# Patient Record
Sex: MALE | Race: WHITE | NOT HISPANIC OR LATINO
[De-identification: names, ages, dates, MRNs, and addresses within clinical notes are randomized per-mention and may not be internally consistent; named-entity substitution may affect disease eponyms.]

---

## 2021-06-03 DIAGNOSIS — M77.8 OTHER ENTHESOPATHIES, NOT ELSEWHERE CLASSIFIED: ICD-10-CM

## 2021-06-03 DIAGNOSIS — Z82.3 FAMILY HISTORY OF STROKE: ICD-10-CM

## 2021-06-03 DIAGNOSIS — Z83.3 FAMILY HISTORY OF DIABETES MELLITUS: ICD-10-CM

## 2021-06-03 DIAGNOSIS — Z78.9 OTHER SPECIFIED HEALTH STATUS: ICD-10-CM

## 2021-06-03 PROBLEM — Z00.00 ENCOUNTER FOR PREVENTIVE HEALTH EXAMINATION: Status: ACTIVE | Noted: 2021-06-03

## 2021-06-14 ENCOUNTER — APPOINTMENT (OUTPATIENT)
Dept: PEDIATRIC ORTHOPEDIC SURGERY | Facility: CLINIC | Age: 46
End: 2021-06-14
Payer: OTHER MISCELLANEOUS

## 2021-06-14 PROCEDURE — 99213 OFFICE O/P EST LOW 20 MIN: CPT

## 2021-06-14 PROCEDURE — 99072 ADDL SUPL MATRL&STAF TM PHE: CPT

## 2021-06-14 RX ORDER — LISINOPRIL AND HYDROCHLOROTHIAZIDE TABLETS 10; 12.5 MG/1; MG/1
10-12.5 TABLET ORAL
Qty: 90 | Refills: 0 | Status: ACTIVE | COMMUNITY
Start: 2021-01-27

## 2021-06-14 RX ORDER — GABAPENTIN 800 MG/1
800 TABLET, COATED ORAL
Qty: 30 | Refills: 0 | Status: ACTIVE | COMMUNITY
Start: 2021-04-05

## 2021-06-14 RX ORDER — HYDROCODONE BITARTRATE AND ACETAMINOPHEN 10; 325 MG/1; MG/1
10-325 TABLET ORAL
Qty: 30 | Refills: 0 | Status: DISCONTINUED | COMMUNITY
Start: 2021-04-05

## 2021-06-14 RX ORDER — BACLOFEN 10 MG/1
10 TABLET ORAL
Qty: 60 | Refills: 0 | Status: ACTIVE | COMMUNITY
Start: 2021-03-08

## 2021-06-14 RX ORDER — NAPROXEN 375 MG/1
375 TABLET ORAL
Qty: 60 | Refills: 0 | Status: ACTIVE | COMMUNITY
Start: 2021-03-15

## 2021-06-14 RX ORDER — SILDENAFIL 20 MG/1
20 TABLET ORAL
Qty: 30 | Refills: 0 | Status: ACTIVE | COMMUNITY
Start: 2020-12-18

## 2021-06-15 ENCOUNTER — APPOINTMENT (OUTPATIENT)
Dept: PEDIATRIC ORTHOPEDIC SURGERY | Facility: CLINIC | Age: 46
End: 2021-06-15
Payer: OTHER MISCELLANEOUS

## 2021-06-15 NOTE — HISTORY OF PRESENT ILLNESS
[de-identified] : This 45-year-old returns for reevaluation of his work injury to the cervical spine.  He has continued to have neck pain on/off especially at the end of a workday.  Radiation into the right shoulder and upper extremity occasional numbness and tingling no weakness.  It is to be noted this patient is currently working full-time in his normal job capacity.  At the end of a workday however and in the evening he does have increasing pain in the above areas.  Physical therapy has been beneficial to him however we have not been able to obtain authorization for further treatments.

## 2021-06-15 NOTE — PHYSICAL EXAM
[No Restrictions] : No work restrictions [Fit For Work] : fit for work [Percentage Of Disability ( ___ % )] : currently ~he/she~ is at [unfilled]% disability [de-identified] : His exam today reveals he has restricted full extension to the neck as well as pain and mild restriction of motion on rotation and tilt to the right.  Mild spasm is noted on the right side of the subaxial region extending into the right trapezius.  There are no trigger points present.  He has good motion to both upper extremities with no motor weakness.  Deep tendon reflexes are symmetric and intact.  These represent no significant changes from his prior exam.  There are no additional body parts affected by this injury. [FreeTextEntry3] : Currently working without restrictions [FreeTextEntry1] : It is my opinion the incidence in question is that the patient described as the competent medical cause of his injury and his complaints are consistent with a history of his illness.  His objective findings on today's exam are consistent with his history of illness.

## 2021-06-15 NOTE — ASSESSMENT
[FreeTextEntry1] : Impression: Right cervical radiculitis with herniated disc by history\par \par This patient will continue to work he has not reached maximal medical improvement.  It is my opinion that further physical therapy is indicated and we are seeking -2 authorization for this.

## 2021-07-28 ENCOUNTER — APPOINTMENT (OUTPATIENT)
Dept: PEDIATRIC ORTHOPEDIC SURGERY | Facility: CLINIC | Age: 46
End: 2021-07-28
Payer: OTHER MISCELLANEOUS

## 2021-07-28 VITALS — WEIGHT: 220 LBS | HEIGHT: 71 IN | BODY MASS INDEX: 30.8 KG/M2

## 2021-07-28 PROCEDURE — 99213 OFFICE O/P EST LOW 20 MIN: CPT

## 2021-07-28 PROCEDURE — 99072 ADDL SUPL MATRL&STAF TM PHE: CPT

## 2021-07-29 NOTE — PHYSICAL EXAM
[No Restrictions] : No work restrictions [Fit For Work] : fit for work [de-identified] : Exam today reveals he has slight restriction of full rotation to the right along with tilt.  Extension is more painful for him and is limited as well.  He extends approximately 15 degrees on today's visit.  Flexion is mildly restricted and less painful.  Paravertebral muscle spasm and tenderness continues more so on the right side extending into the right intrascapular region no obvious trigger points.  There is no atrophy to the right upper extremity he is able to place extremity in space without difficulty.  He remains neurologically intact. [FreeTextEntry3] : Working normal duty [FreeTextEntry1] : There has not been any changes noted of any significance on today's examination with regards to the area of injury the nature of the injury as well as his subjective and objective complaints.  No additional body parts are affected by this work injury.  On today's exam there were no diagnostic tests performed.  We are seeking authorization for physical therapy.  This patient will continue with his prescribed medications and is working his normal duties.  It is my opinion the patient's subjective complaints as well as his objective findings are consistent with the injury described.  He will return for follow-up in 6 weeks time.

## 2021-07-29 NOTE — HISTORY OF PRESENT ILLNESS
[de-identified] : This 45-year-old returns for reevaluation of his work injury of the cervical spine.  He continues with no significant interval change since last seen with chronic neck pain and stiffness radiating into the right upper extremity numbness and paresthesias no motor weakness.  He continues to work normal duties.  As well he continues under the care of Dr. Dean, pain management specialist who has treated him with one epidural steroid injection as well as multiple trigger point injections.  He continues on baclofen and gabapentin and codeine for pain.

## 2021-07-29 NOTE — ASSESSMENT
[FreeTextEntry1] : Impression: Herniated disc with right cervical radiculitis.\par \par He will continue with his medications as prescribed along with follow-up with the pain management specialist.  We are seeking authorization for renewed physical therapy.  He will continue working.  I will see him in 6 weeks.

## 2021-09-14 ENCOUNTER — APPOINTMENT (OUTPATIENT)
Dept: PEDIATRIC ORTHOPEDIC SURGERY | Facility: CLINIC | Age: 46
End: 2021-09-14
Payer: OTHER MISCELLANEOUS

## 2021-09-14 VITALS — HEIGHT: 71 IN | WEIGHT: 220 LBS | BODY MASS INDEX: 30.8 KG/M2

## 2021-09-14 PROCEDURE — 99213 OFFICE O/P EST LOW 20 MIN: CPT

## 2021-09-14 PROCEDURE — 99072 ADDL SUPL MATRL&STAF TM PHE: CPT

## 2021-09-14 NOTE — ASSESSMENT
[FreeTextEntry1] : Impression: Herniated disc C5-6 by history with chronic cervical radiculitis.\par \par We are again seeking authorization for physical therapy from the compensation board.  We also seek authorization for further consultation with the pain management specialist Dr. Bach.  He will continue with baclofen and gabapentin as prescribed by the pain management specialist.  He will return in 2 months for follow-up.\par \par My opinion that the incident of July 13, 2020 is the competent medical cause of this patient's illness and is consistent with his history as well as my objective findings.  He will continue working full duty.

## 2021-09-14 NOTE — HISTORY OF PRESENT ILLNESS
[Yes] : The patient is currently working. [de-identified] : This 45-year-old returns today for follow-up of a work injury dating back to July 13, 2020.  This patient has continued to have pain in the base of his neck radiating into the intrascapular region and right upper extremity.  He has stiffness crepitus on motion of his neck.  He is working full duty and has been for some time.  He still is symptomatic by the end of the workday he is miserable.  He has been seeing a chiropractor on occasion with some relief noted.  He has not been seen by the pain management doctor for follow-up of recent.  He continues on gabapentin and baclofen. [FreeTextEntry1] : This is a follow-up to a work injury from July 13, 2020.  This patient is working full-time without restrictions.  He continues with pain stiffness to his neck with pain that radiates into the intrascapular region and right upper extremity.  He does note crepitus on motion of his neck these are features that have continued over time.  He has been seeing a chiropractor on/off with some benefit noted.  He has not been seen by pain management as of recent.  His complaints have been relatively unchanged over time with periods of exacerbation depending on his level of activity at work..  There have been no diagnostic test recently performed.  We have been seeking authorization for physical therapy. [FreeTextEntry4] : Patient treatment in the past has included trigger point injections nonsteroidal anti-inflammatory medications and physical therapy.

## 2021-09-14 NOTE — PHYSICAL EXAM
[de-identified] : His exam today reveals normal stance and gait.  He has painful motion to the cervical spine with slight restriction of rotation and tilt to both sides of the midline.  There is no atrophy to either shoulder girdle.  He does have spasm and tenderness in the base of the spine and intrascapular region with no obvious trigger points on today's visit.  He has good motion of both upper extremities in space.  He is neurologically intact.  He does have a mildly positive Spurling's test on today's exam

## 2021-11-16 ENCOUNTER — APPOINTMENT (OUTPATIENT)
Dept: PEDIATRIC ORTHOPEDIC SURGERY | Facility: CLINIC | Age: 46
End: 2021-11-16

## 2021-11-23 ENCOUNTER — APPOINTMENT (OUTPATIENT)
Dept: PEDIATRIC ORTHOPEDIC SURGERY | Facility: CLINIC | Age: 46
End: 2021-11-23
Payer: OTHER MISCELLANEOUS

## 2021-11-23 VITALS — HEIGHT: 71 IN | BODY MASS INDEX: 30.8 KG/M2 | WEIGHT: 220 LBS

## 2021-11-23 PROCEDURE — 99213 OFFICE O/P EST LOW 20 MIN: CPT | Mod: 25

## 2021-11-23 PROCEDURE — 99072 ADDL SUPL MATRL&STAF TM PHE: CPT

## 2021-11-23 PROCEDURE — 20610 DRAIN/INJ JOINT/BURSA W/O US: CPT

## 2021-11-23 RX ORDER — NAPROXEN 375 MG/1
375 TABLET, DELAYED RELEASE ORAL TWICE DAILY
Qty: 60 | Refills: 1 | Status: ACTIVE | COMMUNITY
Start: 2021-11-23 | End: 1900-01-01

## 2021-11-23 NOTE — PROCEDURE
[FreeTextEntry1] : Under sterile technique using a 21-gauge needle with a Betadine prep the right shoulder was injected through a lateral subacromial portal with 40 mg of Kenalog and 5 cc of 1% lidocaine with a Band-Aid dressing applied at the conclusion.  This was tolerated without incident.

## 2021-11-23 NOTE — HISTORY OF PRESENT ILLNESS
[FreeTextEntry1] : This 46-year-old right-handed gentleman returns for follow-up of his work injury from July 12, 2020.  This to the cervical spine as well as the right shoulder.  He has continued to have pain and stiffness and limited motion to the right shoulder with the sensation of weakness.  His neck pain has been ongoing with radiation down the right upper extremity with numbness and tingling in the thumb index and middle fingers.  He has had 1 epidural steroid injection into the cervical spine with minimal improvement he is scheduled to see the pain management doctor in the next 1-2 weeks.  He has continued to work normal duties.

## 2021-11-23 NOTE — ASSESSMENT
[FreeTextEntry1] : Impression: Herniated disc cervical spine with cervical radiculitis strain right rotator cuff rule out rotator cuff tear.\par \par The patient has been injected into the subacromial space will continue with Naprosyn.  Physical therapy authorization has been sought along with MRI of the right shoulder.  He will return in 6 weeks for reevaluation.\par \par He will continue working normal duties.  His subjective and objective findings are consistent with his injury which is causally related to the accident of July 12, 2020 he will return in 6 weeks for reevaluation

## 2021-11-23 NOTE — PHYSICAL EXAM
[de-identified] : His exam today reveals he has mild restriction of extension of the cervical spine as well as rotation and tilt to the right.  Mild spasm is noted on the right side of the subaxial region extending into the trapezius and intrascapular region on the right.  No trigger points present.  The right shoulder reveals no evidence of atrophy though he does have mild limitation of forward flexion abduction as well as rotation both internal/external.  Positive Neer and Thibodeaux he does have mild weakness on testing of the supraspinatus.  The joints distally are unremarkable to exam

## 2022-01-11 ENCOUNTER — APPOINTMENT (OUTPATIENT)
Dept: PEDIATRIC ORTHOPEDIC SURGERY | Facility: CLINIC | Age: 47
End: 2022-01-11
Payer: OTHER MISCELLANEOUS

## 2022-01-11 VITALS — HEIGHT: 71 IN | WEIGHT: 220 LBS | BODY MASS INDEX: 30.8 KG/M2

## 2022-01-11 PROCEDURE — 99213 OFFICE O/P EST LOW 20 MIN: CPT

## 2022-01-11 PROCEDURE — 73030 X-RAY EXAM OF SHOULDER: CPT

## 2022-01-11 PROCEDURE — 99072 ADDL SUPL MATRL&STAF TM PHE: CPT

## 2022-01-11 NOTE — ASSESSMENT
[FreeTextEntry1] : Impression: Cervical radiculitis, strain right rotator cuff rule out tear.\par \par The again are seeking authorization for MRI without contrast of the right shoulder.  He will continue under the care of Dr. Zaida grijalva pain management specialist.  I will see him in 2 months.\par \par Is my opinion there is causal relation of the above neck and right shoulder complaints.

## 2022-01-11 NOTE — PHYSICAL EXAM
[de-identified] : His exam today reveals normal stance.  He has good motion to the cervical spine mild spasm is noted on the right side of the mid axial/subaxial region extending into the trapezius and intrascapular region on the right side.  The right shoulder maintains good motion as well though he does have pain on full forward flexion abduction.  He has pain on palpation of the subacromial space.  No instability on stress.  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  Mild degenerative change to the glenohumeral joint

## 2022-01-11 NOTE — HISTORY OF PRESENT ILLNESS
[FreeTextEntry1] : This patient returns for reevaluation of his work injury to the neck and right shoulder from July 13, 2020.  He had a second epidural injection he still has symptomatology to the right side of his neck that radiates into the right shoulder girdle.  He still is having pain to his right shoulder as well though he maintains reasonable motion.  He has continued to work full-time.  This without restrictions.  We are awaiting authorization for MRI of the right shoulder.  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  This was many years ago and he has tolerated this nonunion over the long-term without any deficits whatsoever.

## 2022-03-08 ENCOUNTER — APPOINTMENT (OUTPATIENT)
Dept: PEDIATRIC ORTHOPEDIC SURGERY | Facility: CLINIC | Age: 47
End: 2022-03-08
Payer: OTHER MISCELLANEOUS

## 2022-03-08 VITALS — WEIGHT: 220 LBS | BODY MASS INDEX: 30.8 KG/M2 | HEIGHT: 71 IN

## 2022-03-08 PROCEDURE — 99212 OFFICE O/P EST SF 10 MIN: CPT | Mod: 25

## 2022-03-08 PROCEDURE — 99072 ADDL SUPL MATRL&STAF TM PHE: CPT

## 2022-03-08 PROCEDURE — 20610 DRAIN/INJ JOINT/BURSA W/O US: CPT

## 2022-03-08 RX ORDER — VALACYCLOVIR 1 G/1
1 TABLET, FILM COATED ORAL
Qty: 6 | Refills: 0 | Status: ACTIVE | COMMUNITY
Start: 2022-02-24

## 2022-03-08 RX ORDER — NAPROXEN 375 MG/1
375 TABLET, DELAYED RELEASE ORAL TWICE DAILY
Qty: 60 | Refills: 1 | Status: ACTIVE | COMMUNITY
Start: 2022-03-08 | End: 1900-01-01

## 2022-03-08 NOTE — HISTORY OF PRESENT ILLNESS
[FreeTextEntry1] : This 46-year-old returns for follow-up of his work-related injury of July 13, 2020.  Ongoing complaints of pain to the neck that radiate into the right upper extremity as well as pain in the right shoulder.  He does have clicking on motion of the shoulder.  There have been no significant interval changes since last seen other than ongoing pain in the above areas.  He does continue to work full-time without restrictions.  He continues under the care of Dr. Pal pain management specialist.

## 2022-03-08 NOTE — PROCEDURE
[FreeTextEntry1] : The right shoulder following a Betadine prep was injected from an anterior portal with a 22-gauge needle with 40 mg of Kenalog and 6 cc of 1% lidocaine with a Band-Aid dressing applied at the conclusion this was tolerated without incident

## 2022-03-08 NOTE — ASSESSMENT
[FreeTextEntry1] : Impression: Right cervical radiculitis, strain right rotator cuff.\par \par Following the injection which was tolerated well he has been instructed to continue with a home exercise program along with continued use of Naprosyn.  He will continue working his normal duties without restrictions.  We have again discussed his MRI findings as well as his options.  He will return in 6 weeks

## 2022-04-19 ENCOUNTER — APPOINTMENT (OUTPATIENT)
Dept: PEDIATRIC ORTHOPEDIC SURGERY | Facility: CLINIC | Age: 47
End: 2022-04-19
Payer: OTHER MISCELLANEOUS

## 2022-04-19 VITALS — HEIGHT: 71 IN | BODY MASS INDEX: 30.8 KG/M2 | WEIGHT: 220 LBS | TEMPERATURE: 96.6 F

## 2022-04-19 PROCEDURE — 99072 ADDL SUPL MATRL&STAF TM PHE: CPT

## 2022-04-19 PROCEDURE — 99212 OFFICE O/P EST SF 10 MIN: CPT

## 2022-04-19 RX ORDER — NAPROXEN 375 MG/1
375 TABLET ORAL TWICE DAILY
Qty: 60 | Refills: 1 | Status: ACTIVE | COMMUNITY
Start: 2022-04-19 | End: 1900-01-01

## 2022-04-19 NOTE — HISTORY OF PRESENT ILLNESS
[FreeTextEntry1] : This patient returns today for follow-up of his work injury of July 13, 2020.  He has not had any significant change with regards to his ongoing complaints of pain in the neck that radiate into he right upper extremity as well as pain in the right shoulder.  There have not been any significant changes since his last visit.  He notes clicking on motion of his shoulder though he is functioning reasonably well.  He continues under the care of of Dr. Pal pain management.

## 2022-04-19 NOTE — ASSESSMENT
[FreeTextEntry1] : Impression: Right cervical radiculopathy, strain rotator cuff right shoulder.\par \par He will continue with a home exercise program along with Naprosyn which does provide relief.  He is working full-time normal duties without any restrictions and will continue to do so.  He will return in 3 months

## 2022-04-19 NOTE — PHYSICAL EXAM
[de-identified] : Exam today reveals supple motion to the cervical spine with mild discomfort at the limits of flexion and rotation.  With mild spasm more so on the right side of the mid axial region.  No trigger points present.  The right shoulder his motion is mildly restricted in abduction and forward flexion.  Rotation minimally restricted as well.  His overall strength is 4+/5 to the shoulder girdle.  He does remain neurologically intact

## 2022-05-04 RX ORDER — CELECOXIB 200 MG/1
200 CAPSULE ORAL DAILY
Qty: 30 | Refills: 1 | Status: ACTIVE | COMMUNITY
Start: 2022-05-04 | End: 1900-01-01

## 2022-07-19 ENCOUNTER — APPOINTMENT (OUTPATIENT)
Dept: PEDIATRIC ORTHOPEDIC SURGERY | Facility: CLINIC | Age: 47
End: 2022-07-19

## 2022-07-19 VITALS — WEIGHT: 220 LBS | BODY MASS INDEX: 30.8 KG/M2 | HEIGHT: 71 IN | TEMPERATURE: 97.2 F

## 2022-07-19 PROCEDURE — 99212 OFFICE O/P EST SF 10 MIN: CPT

## 2022-07-19 PROCEDURE — 99072 ADDL SUPL MATRL&STAF TM PHE: CPT

## 2022-07-19 RX ORDER — NAPROXEN 375 MG/1
375 TABLET, DELAYED RELEASE ORAL TWICE DAILY
Qty: 60 | Refills: 1 | Status: ACTIVE | COMMUNITY
Start: 2022-07-19 | End: 1900-01-01

## 2022-07-21 NOTE — PHYSICAL EXAM
[de-identified] : His exam again reveals good motion to the cervical spine though it is with pain at the limits especially on rotation and tilt to both sides.  Mild spasm is noted in the subaxial region right greater than left.  No trigger points present on today's exam.  Evaluation of the right shoulder reveals mild limitation of full abduction and forward flexion in the scapular plane his rotation is minimally restricted in external as well as internal rotation reaching behind the back.  His strength is acceptable for age.  He is neurologically stable

## 2022-07-21 NOTE — HISTORY OF PRESENT ILLNESS
[de-identified] : This 46-year-old [FreeTextEntry1] : This 46-year-old returns for follow-up of his work injury of July 13, 2020.  He has ongoing complaints of pain in the neck that radiate into the right upper extremity as well as pain and restricted motion of a mild nature to the right shoulder.  There has been no significant interval change with regards to his subjective complaints since last seen.  He is scheduled to see the pain management specialist for further evaluation and treatment of his above complaints.

## 2022-07-21 NOTE — ASSESSMENT
[FreeTextEntry1] : Impression: Status post work injury with cervical radiculitis and strain right rotator cuff.\par \par This patient will continue conservatively with naproxen.  He will follow-up with the pain management specialist.  He is working full-time without restrictions.  He will return in 3 months for follow-up

## 2022-11-02 ENCOUNTER — APPOINTMENT (OUTPATIENT)
Dept: PEDIATRIC ORTHOPEDIC SURGERY | Facility: CLINIC | Age: 47
End: 2022-11-02

## 2022-11-02 VITALS — BODY MASS INDEX: 30.8 KG/M2 | WEIGHT: 220 LBS | TEMPERATURE: 97.2 F | HEIGHT: 71 IN

## 2022-11-02 PROCEDURE — 99213 OFFICE O/P EST LOW 20 MIN: CPT

## 2022-11-02 PROCEDURE — 99072 ADDL SUPL MATRL&STAF TM PHE: CPT

## 2022-11-02 RX ORDER — NAPROXEN 375 MG/1
375 TABLET, DELAYED RELEASE ORAL TWICE DAILY
Qty: 60 | Refills: 2 | Status: ACTIVE | COMMUNITY
Start: 2022-11-02 | End: 1900-01-01

## 2022-11-02 NOTE — HISTORY OF PRESENT ILLNESS
[FreeTextEntry1] : This 47-year-old returns for follow-up of his work injury of July 13, 2020.  This patient is essentially unchanged with continued complaints of pain mainly in the right side of the cervical spine radiating into the right upper extremity as well as into the right intrascapular region.  He has not had any significant motor weakness bladder or bowel dysfunction.  He continues with nonsteroidals and a home exercise program.  He is followed by pain management for injections as part of his management for herniated disc of the cervical spine and cervical radiculitis.  It is to be noted he is working normal duty.

## 2022-11-02 NOTE — PHYSICAL EXAM
[de-identified] : His exam today reveals he is maintaining reasonable motion to the cervical spine though again he has pain at the limits of motion in all planes.  Spasm is noted on the right side of his neck with no obvious trigger points present.  Right shoulder reveals again mild limitation of full abduction and forward flexion.  His rotation is good.  His strength is acceptable for his age and he is continuing to be neurologically stable

## 2022-11-02 NOTE — ASSESSMENT
[FreeTextEntry1] : Impression: Herniated disc cervical spine with right cervical radiculitis strain of right rotator cuff\par \par He will continue conservatively with Naprosyn and will continue to follow-up with his pain management physician.  He will return in 3 months for recheck

## 2023-02-01 ENCOUNTER — APPOINTMENT (OUTPATIENT)
Dept: PEDIATRIC ORTHOPEDIC SURGERY | Facility: CLINIC | Age: 48
End: 2023-02-01
Payer: OTHER MISCELLANEOUS

## 2023-02-01 VITALS
HEIGHT: 71 IN | TEMPERATURE: 97.2 F | WEIGHT: 220 LBS | SYSTOLIC BLOOD PRESSURE: 132 MMHG | DIASTOLIC BLOOD PRESSURE: 86 MMHG | BODY MASS INDEX: 30.8 KG/M2

## 2023-02-01 PROCEDURE — 99072 ADDL SUPL MATRL&STAF TM PHE: CPT

## 2023-02-01 PROCEDURE — 99212 OFFICE O/P EST SF 10 MIN: CPT

## 2023-02-01 RX ORDER — NAPROXEN 375 MG/1
375 TABLET, DELAYED RELEASE ORAL TWICE DAILY
Qty: 60 | Refills: 1 | Status: ACTIVE | COMMUNITY
Start: 2023-02-01 | End: 1900-01-01

## 2023-02-01 NOTE — PHYSICAL EXAM
[de-identified] : His examination today reveals mild restricted motion to the cervical spine in flexion extension.  Extension tends to be more uncomfortable for him.  Mild spasm is noted on both sides of the midline in the subaxial region no trigger points present.  His right shoulder has a very mild arc of impingement without evidence of instability and reasonable strength present.  He is neurologically intact and stable

## 2023-02-01 NOTE — HISTORY OF PRESENT ILLNESS
[de-identified] : This 47-year-old returns for follow-up of a work injury dated July 13, 2020.  Over time he has continued to have episodes of neck pain and stiffness radiating into the right upper extremity as well as stiffness of his right shoulder.  At times this will create trouble for him using his arms above the horizontal plane.  Overall he is pretty much status quo.  He is scheduled to see the pain management specialist in approximately 4-6 weeks time.  He is working full-time without restrictions [FreeTextEntry1] : This patient returns for follow-up of his work injury dated July 13, 2020.  He continues to episodically have pain and stiffness to his neck as well as to the right shoulder.  He has had no significant neural

## 2023-02-01 NOTE — ASSESSMENT
[FreeTextEntry1] : Impression: Right cervical radiculitis, strain right rotator cuff.\par \par Will continue with symptomatic treatment along with Naprosyn which has been reordered.  He will follow-up with pain management specialist.  There have been no significant changes subjectively as well as objectively with regards to this patient and his work injury.

## 2023-05-03 ENCOUNTER — APPOINTMENT (OUTPATIENT)
Dept: PEDIATRIC ORTHOPEDIC SURGERY | Facility: CLINIC | Age: 48
End: 2023-05-03
Payer: OTHER MISCELLANEOUS

## 2023-05-03 VITALS
TEMPERATURE: 96.9 F | WEIGHT: 220 LBS | SYSTOLIC BLOOD PRESSURE: 135 MMHG | DIASTOLIC BLOOD PRESSURE: 70 MMHG | BODY MASS INDEX: 30.8 KG/M2 | HEIGHT: 71 IN

## 2023-05-03 PROCEDURE — 99213 OFFICE O/P EST LOW 20 MIN: CPT

## 2023-05-03 RX ORDER — NAPROXEN 375 MG/1
375 TABLET, DELAYED RELEASE ORAL TWICE DAILY
Qty: 60 | Refills: 1 | Status: ACTIVE | COMMUNITY
Start: 2023-05-03 | End: 1900-01-01

## 2023-05-03 NOTE — HISTORY OF PRESENT ILLNESS
[FreeTextEntry1] : This 47-year-old returns for follow-up of his work injury July 13, 2020.  He continues to have discomfort in his right shoulder but more so of the neck with pain numbness and tingling that radiates down the right upper extremity.  He was recently seen by the pain management doctor who provided him with trigger point injections.  He continues under his care as well.  Naproxen which has been prescribed does provide some relief.  He is working full-time without restrictions.  There has been no other injuries to have impact on the above.

## 2023-05-03 NOTE — ASSESSMENT
[FreeTextEntry1] : Impression: Right cervical radiculitis with herniated disks, strain right rotator cuff.\par \par He will continue with Naprosyn as prescribed with GI precautions.  Exercise program as he has continued over time.  He will follow-up in 3 months.  It is my impression there is causal relationship between the work injury of the above date and his subjective complaints and objective exam.

## 2023-05-03 NOTE — PHYSICAL EXAM
[de-identified] : His exam reveals painful motion to the neck especially in rotation to the right and tilt extension causes him pain as well.  There is spasm and tenderness on the right side of the subaxial region extending into the trapezius though no obvious trigger points present.  He does maintain good motion to his right shoulder though it is with discomfort at the limits his strength is acceptable.  No obvious instability on stress of the glenohumeral joint.  He is neurologically stable

## 2023-08-25 ENCOUNTER — APPOINTMENT (OUTPATIENT)
Dept: PEDIATRIC ORTHOPEDIC SURGERY | Facility: CLINIC | Age: 48
End: 2023-08-25

## 2023-09-01 ENCOUNTER — APPOINTMENT (OUTPATIENT)
Dept: PEDIATRIC ORTHOPEDIC SURGERY | Facility: CLINIC | Age: 48
End: 2023-09-01
Payer: OTHER MISCELLANEOUS

## 2023-09-01 VITALS
TEMPERATURE: 97.1 F | WEIGHT: 220 LBS | HEIGHT: 71 IN | DIASTOLIC BLOOD PRESSURE: 88 MMHG | SYSTOLIC BLOOD PRESSURE: 139 MMHG | BODY MASS INDEX: 30.8 KG/M2

## 2023-09-01 PROCEDURE — 99213 OFFICE O/P EST LOW 20 MIN: CPT

## 2023-09-01 NOTE — ASSESSMENT
[FreeTextEntry1] : Impression: Right cervical radiculopathy, strain rotator cuff right.  We will continue conservatively with medications as prescribed and a home exercise program and will return in approximately 4 months.  His subjective and objective findings are felt to be consistent with the work related injury of the date mentioned above

## 2023-09-01 NOTE — PHYSICAL EXAM
[de-identified] : His exam today reveals he has good motion to the cervical spine though it is with discomfort at the limits of full extension and rotation to the right mild spasm is noted on the right side of the subaxial region extending into the trapezius though no trigger points present.  The right shoulder has only mild impingement phenomena noted with mild restriction of full forward flexion and abduction in the scapular plane on today's visit strength of the extremity is good.

## 2023-09-01 NOTE — HISTORY OF PRESENT ILLNESS
[de-identified] : This 47-year-old returns for follow-up of his neck and right shoulder.  These are work injuries from July 13, 2020.  He has had episodic pain in the neck that radiates into the right shoulder girdle with symptoms suggestive of impingement on repetitive overhead use of the extremity.  Occasional numbness and tingling going down the extremity.  He continues to work full-time unrestricted.

## 2023-10-10 RX ORDER — NAPROXEN 375 MG/1
375 TABLET, DELAYED RELEASE ORAL TWICE DAILY
Qty: 60 | Refills: 1 | Status: ACTIVE | COMMUNITY
Start: 2023-10-10 | End: 1900-01-01

## 2024-01-03 ENCOUNTER — APPOINTMENT (OUTPATIENT)
Dept: PEDIATRIC ORTHOPEDIC SURGERY | Facility: CLINIC | Age: 49
End: 2024-01-03

## 2024-01-17 ENCOUNTER — APPOINTMENT (OUTPATIENT)
Dept: PEDIATRIC ORTHOPEDIC SURGERY | Facility: CLINIC | Age: 49
End: 2024-01-17
Payer: OTHER MISCELLANEOUS

## 2024-01-17 VITALS
HEIGHT: 73 IN | DIASTOLIC BLOOD PRESSURE: 94 MMHG | BODY MASS INDEX: 29.29 KG/M2 | TEMPERATURE: 96.8 F | SYSTOLIC BLOOD PRESSURE: 142 MMHG | WEIGHT: 221 LBS

## 2024-01-17 DIAGNOSIS — M54.12 RADICULOPATHY, CERVICAL REGION: ICD-10-CM

## 2024-01-17 PROCEDURE — 99212 OFFICE O/P EST SF 10 MIN: CPT

## 2024-01-17 NOTE — ASSESSMENT
[FreeTextEntry1] : Impression: Herniated disc cervical spine cervical radiculitis, strain right rotator cuff.  Will continue symptomatically with conservative management will return in 3-4 months time

## 2024-01-17 NOTE — PHYSICAL EXAM
[de-identified] : His exam reveals he has reasonable motion to the cervical spine at today's visit mild spasm and tenderness noted on the right side of the subaxial region extending into the trapezius no trigger points present he has mild restriction of full forward flexion abduction in scapular plane to the right shoulder with prominence of the AC joint.  He is neurologically stable

## 2024-01-17 NOTE — HISTORY OF PRESENT ILLNESS
[FreeTextEntry1] : This patient returns for reevaluation of her work injury from January 13, 2020.  He continues with chronic neck and right shoulder pain his pain radiates distally into the hand numbness and tingling he is followed by pain management.  He continues on Naprosyn which does provide some relief.  He continues to work full-time without restrictions.

## 2024-04-17 ENCOUNTER — APPOINTMENT (OUTPATIENT)
Dept: PEDIATRIC ORTHOPEDIC SURGERY | Facility: CLINIC | Age: 49
End: 2024-04-17

## 2024-06-25 ENCOUNTER — APPOINTMENT (OUTPATIENT)
Dept: PEDIATRIC ORTHOPEDIC SURGERY | Facility: CLINIC | Age: 49
End: 2024-06-25
Payer: OTHER MISCELLANEOUS

## 2024-06-25 VITALS — BODY MASS INDEX: 29.29 KG/M2 | HEIGHT: 73 IN | TEMPERATURE: 97 F | WEIGHT: 221 LBS

## 2024-06-25 DIAGNOSIS — S46.011A STRAIN OF MUSCLE(S) AND TENDON(S) OF THE ROTATOR CUFF OF RIGHT SHOULDER, INITIAL ENCOUNTER: ICD-10-CM

## 2024-06-25 DIAGNOSIS — M50.223 OTHER CERVICAL DISC DISPLACEMENT AT C6-C7 LEVEL: ICD-10-CM

## 2024-06-25 PROCEDURE — 99212 OFFICE O/P EST SF 10 MIN: CPT

## 2024-06-25 RX ORDER — NAPROXEN 375 MG/1
375 TABLET, DELAYED RELEASE ORAL TWICE DAILY
Qty: 60 | Refills: 1 | Status: ACTIVE | COMMUNITY
Start: 2024-06-25 | End: 1900-01-01

## 2024-09-27 ENCOUNTER — APPOINTMENT (OUTPATIENT)
Dept: PEDIATRIC ORTHOPEDIC SURGERY | Facility: CLINIC | Age: 49
End: 2024-09-27

## 2024-10-25 ENCOUNTER — APPOINTMENT (OUTPATIENT)
Dept: PEDIATRIC ORTHOPEDIC SURGERY | Facility: CLINIC | Age: 49
End: 2024-10-25
Payer: OTHER MISCELLANEOUS

## 2024-10-25 DIAGNOSIS — S46.011A STRAIN OF MUSCLE(S) AND TENDON(S) OF THE ROTATOR CUFF OF RIGHT SHOULDER, INITIAL ENCOUNTER: ICD-10-CM

## 2024-10-25 PROCEDURE — 99213 OFFICE O/P EST LOW 20 MIN: CPT | Mod: 25

## 2024-10-25 PROCEDURE — 20610 DRAIN/INJ JOINT/BURSA W/O US: CPT | Mod: RT

## 2024-10-25 RX ORDER — NAPROXEN 375 MG/1
375 TABLET, DELAYED RELEASE ORAL TWICE DAILY
Qty: 60 | Refills: 1 | Status: ACTIVE | COMMUNITY
Start: 2024-10-25 | End: 1900-01-01

## 2025-02-26 ENCOUNTER — APPOINTMENT (OUTPATIENT)
Dept: PEDIATRIC ORTHOPEDIC SURGERY | Facility: CLINIC | Age: 50
End: 2025-02-26

## 2025-02-26 RX ORDER — NAPROXEN 375 MG/1
375 TABLET, DELAYED RELEASE ORAL TWICE DAILY
Qty: 60 | Refills: 1 | Status: ACTIVE | COMMUNITY
Start: 2025-02-26 | End: 1900-01-01

## 2025-03-28 ENCOUNTER — NON-APPOINTMENT (OUTPATIENT)
Age: 50
End: 2025-03-28

## 2025-03-28 ENCOUNTER — APPOINTMENT (OUTPATIENT)
Dept: PEDIATRIC ORTHOPEDIC SURGERY | Facility: CLINIC | Age: 50
End: 2025-03-28
Payer: OTHER MISCELLANEOUS

## 2025-03-28 VITALS — TEMPERATURE: 96.7 F | BODY MASS INDEX: 29.29 KG/M2 | WEIGHT: 221 LBS | HEIGHT: 73 IN

## 2025-03-28 DIAGNOSIS — S46.011A STRAIN OF MUSCLE(S) AND TENDON(S) OF THE ROTATOR CUFF OF RIGHT SHOULDER, INITIAL ENCOUNTER: ICD-10-CM

## 2025-03-28 DIAGNOSIS — M50.223 OTHER CERVICAL DISC DISPLACEMENT AT C6-C7 LEVEL: ICD-10-CM

## 2025-03-28 DIAGNOSIS — M54.12 RADICULOPATHY, CERVICAL REGION: ICD-10-CM

## 2025-03-28 PROCEDURE — 99212 OFFICE O/P EST SF 10 MIN: CPT

## 2025-07-23 ENCOUNTER — APPOINTMENT (OUTPATIENT)
Dept: PEDIATRIC ORTHOPEDIC SURGERY | Facility: CLINIC | Age: 50
End: 2025-07-23
Payer: OTHER MISCELLANEOUS

## 2025-07-23 VITALS
BODY MASS INDEX: 29.16 KG/M2 | SYSTOLIC BLOOD PRESSURE: 120 MMHG | TEMPERATURE: 97 F | DIASTOLIC BLOOD PRESSURE: 70 MMHG | HEIGHT: 73 IN | WEIGHT: 220 LBS

## 2025-07-23 DIAGNOSIS — S46.011A STRAIN OF MUSCLE(S) AND TENDON(S) OF THE ROTATOR CUFF OF RIGHT SHOULDER, INITIAL ENCOUNTER: ICD-10-CM

## 2025-07-23 DIAGNOSIS — M54.12 RADICULOPATHY, CERVICAL REGION: ICD-10-CM

## 2025-07-23 DIAGNOSIS — M50.223 OTHER CERVICAL DISC DISPLACEMENT AT C6-C7 LEVEL: ICD-10-CM

## 2025-07-23 PROCEDURE — 99212 OFFICE O/P EST SF 10 MIN: CPT

## 2025-07-23 PROCEDURE — 72040 X-RAY EXAM NECK SPINE 2-3 VW: CPT
